# Patient Record
Sex: MALE | Race: WHITE | Employment: FULL TIME | URBAN - METROPOLITAN AREA
[De-identification: names, ages, dates, MRNs, and addresses within clinical notes are randomized per-mention and may not be internally consistent; named-entity substitution may affect disease eponyms.]

---

## 2018-12-28 ENCOUNTER — APPOINTMENT (OUTPATIENT)
Dept: GENERAL RADIOLOGY | Age: 58
End: 2018-12-28
Attending: EMERGENCY MEDICINE
Payer: COMMERCIAL

## 2018-12-28 ENCOUNTER — APPOINTMENT (OUTPATIENT)
Dept: CT IMAGING | Age: 58
End: 2018-12-28
Attending: EMERGENCY MEDICINE
Payer: COMMERCIAL

## 2018-12-28 ENCOUNTER — HOSPITAL ENCOUNTER (EMERGENCY)
Age: 58
Discharge: HOME | End: 2018-12-28
Attending: EMERGENCY MEDICINE | Admitting: EMERGENCY MEDICINE
Payer: COMMERCIAL

## 2018-12-28 VITALS
HEIGHT: 72 IN | TEMPERATURE: 98.3 F | HEART RATE: 83 BPM | DIASTOLIC BLOOD PRESSURE: 67 MMHG | WEIGHT: 315 LBS | BODY MASS INDEX: 42.66 KG/M2 | RESPIRATION RATE: 11 BRPM | OXYGEN SATURATION: 98 % | SYSTOLIC BLOOD PRESSURE: 118 MMHG

## 2018-12-28 DIAGNOSIS — R07.9 CHEST PAIN, UNSPECIFIED TYPE: Primary | ICD-10-CM

## 2018-12-28 LAB
ALBUMIN SERPL-MCNC: 3.6 G/DL (ref 3.5–5)
ALBUMIN/GLOB SERPL: 0.9 {RATIO} (ref 1.1–2.2)
ALP SERPL-CCNC: 98 U/L (ref 45–117)
ALT SERPL-CCNC: 70 U/L (ref 12–78)
ANION GAP SERPL CALC-SCNC: 7 MMOL/L (ref 5–15)
AST SERPL-CCNC: 101 U/L (ref 15–37)
ATRIAL RATE: 88 BPM
ATRIAL RATE: 89 BPM
BASOPHILS # BLD: 0 K/UL (ref 0–0.1)
BASOPHILS NFR BLD: 0 % (ref 0–1)
BILIRUB SERPL-MCNC: 1.1 MG/DL (ref 0.2–1)
BUN SERPL-MCNC: 18 MG/DL (ref 6–20)
BUN/CREAT SERPL: 22 (ref 12–20)
CALCIUM SERPL-MCNC: 8.5 MG/DL (ref 8.5–10.1)
CALCULATED P AXIS, ECG09: 48 DEGREES
CALCULATED P AXIS, ECG09: 51 DEGREES
CALCULATED R AXIS, ECG10: 38 DEGREES
CALCULATED R AXIS, ECG10: 40 DEGREES
CALCULATED T AXIS, ECG11: 25 DEGREES
CALCULATED T AXIS, ECG11: 4 DEGREES
CHLORIDE SERPL-SCNC: 105 MMOL/L (ref 97–108)
CK MB CFR SERPL CALC: 0.6 % (ref 0–2.5)
CK MB SERPL-MCNC: 2.1 NG/ML (ref 5–25)
CK SERPL-CCNC: 370 U/L (ref 39–308)
CO2 SERPL-SCNC: 27 MMOL/L (ref 21–32)
CREAT SERPL-MCNC: 0.81 MG/DL (ref 0.7–1.3)
DIAGNOSIS, 93000: NORMAL
DIAGNOSIS, 93000: NORMAL
DIFFERENTIAL METHOD BLD: NORMAL
EOSINOPHIL # BLD: 0.1 K/UL (ref 0–0.4)
EOSINOPHIL NFR BLD: 1 % (ref 0–7)
ERYTHROCYTE [DISTWIDTH] IN BLOOD BY AUTOMATED COUNT: 14.4 % (ref 11.5–14.5)
GLOBULIN SER CALC-MCNC: 3.9 G/DL (ref 2–4)
GLUCOSE SERPL-MCNC: 113 MG/DL (ref 65–100)
HCT VFR BLD AUTO: 44.4 % (ref 36.6–50.3)
HGB BLD-MCNC: 14.7 G/DL (ref 12.1–17)
IMM GRANULOCYTES # BLD: 0 K/UL (ref 0–0.04)
IMM GRANULOCYTES NFR BLD AUTO: 0 % (ref 0–0.5)
LIPASE SERPL-CCNC: 104 U/L (ref 73–393)
LYMPHOCYTES # BLD: 2.2 K/UL (ref 0.8–3.5)
LYMPHOCYTES NFR BLD: 27 % (ref 12–49)
MCH RBC QN AUTO: 29.9 PG (ref 26–34)
MCHC RBC AUTO-ENTMCNC: 33.1 G/DL (ref 30–36.5)
MCV RBC AUTO: 90.4 FL (ref 80–99)
MONOCYTES # BLD: 0.7 K/UL (ref 0–1)
MONOCYTES NFR BLD: 8 % (ref 5–13)
NEUTS SEG # BLD: 5.2 K/UL (ref 1.8–8)
NEUTS SEG NFR BLD: 63 % (ref 32–75)
NRBC # BLD: 0 K/UL (ref 0–0.01)
NRBC BLD-RTO: 0 PER 100 WBC
P-R INTERVAL, ECG05: 142 MS
P-R INTERVAL, ECG05: 144 MS
PLATELET # BLD AUTO: 231 K/UL (ref 150–400)
PMV BLD AUTO: 10.8 FL (ref 8.9–12.9)
POTASSIUM SERPL-SCNC: 4 MMOL/L (ref 3.5–5.1)
PROT SERPL-MCNC: 7.5 G/DL (ref 6.4–8.2)
Q-T INTERVAL, ECG07: 356 MS
Q-T INTERVAL, ECG07: 360 MS
QRS DURATION, ECG06: 104 MS
QRS DURATION, ECG06: 108 MS
QTC CALCULATION (BEZET), ECG08: 433 MS
QTC CALCULATION (BEZET), ECG08: 435 MS
RBC # BLD AUTO: 4.91 M/UL (ref 4.1–5.7)
SODIUM SERPL-SCNC: 139 MMOL/L (ref 136–145)
TROPONIN I SERPL-MCNC: <0.05 NG/ML
TROPONIN I SERPL-MCNC: <0.05 NG/ML
VENTRICULAR RATE, ECG03: 88 BPM
VENTRICULAR RATE, ECG03: 89 BPM
WBC # BLD AUTO: 8.2 K/UL (ref 4.1–11.1)

## 2018-12-28 PROCEDURE — 74011250636 HC RX REV CODE- 250/636

## 2018-12-28 PROCEDURE — 80053 COMPREHEN METABOLIC PANEL: CPT

## 2018-12-28 PROCEDURE — 93005 ELECTROCARDIOGRAM TRACING: CPT

## 2018-12-28 PROCEDURE — 96375 TX/PRO/DX INJ NEW DRUG ADDON: CPT

## 2018-12-28 PROCEDURE — 96374 THER/PROPH/DIAG INJ IV PUSH: CPT

## 2018-12-28 PROCEDURE — 85025 COMPLETE CBC W/AUTO DIFF WBC: CPT

## 2018-12-28 PROCEDURE — 71045 X-RAY EXAM CHEST 1 VIEW: CPT

## 2018-12-28 PROCEDURE — 96361 HYDRATE IV INFUSION ADD-ON: CPT

## 2018-12-28 PROCEDURE — 74174 CTA ABD&PLVS W/CONTRAST: CPT

## 2018-12-28 PROCEDURE — 74011000250 HC RX REV CODE- 250: Performed by: EMERGENCY MEDICINE

## 2018-12-28 PROCEDURE — 82550 ASSAY OF CK (CPK): CPT

## 2018-12-28 PROCEDURE — 84484 ASSAY OF TROPONIN QUANT: CPT

## 2018-12-28 PROCEDURE — 71275 CT ANGIOGRAPHY CHEST: CPT

## 2018-12-28 PROCEDURE — 83690 ASSAY OF LIPASE: CPT

## 2018-12-28 PROCEDURE — 82553 CREATINE MB FRACTION: CPT

## 2018-12-28 PROCEDURE — 74011636320 HC RX REV CODE- 636/320: Performed by: EMERGENCY MEDICINE

## 2018-12-28 PROCEDURE — 36415 COLL VENOUS BLD VENIPUNCTURE: CPT

## 2018-12-28 PROCEDURE — 74011250636 HC RX REV CODE- 250/636: Performed by: EMERGENCY MEDICINE

## 2018-12-28 PROCEDURE — 74011250637 HC RX REV CODE- 250/637: Performed by: EMERGENCY MEDICINE

## 2018-12-28 PROCEDURE — 99284 EMERGENCY DEPT VISIT MOD MDM: CPT

## 2018-12-28 PROCEDURE — 99285 EMERGENCY DEPT VISIT HI MDM: CPT

## 2018-12-28 RX ORDER — NITROGLYCERIN 0.4 MG/1
0.4 TABLET SUBLINGUAL
Status: DISCONTINUED | OUTPATIENT
Start: 2018-12-28 | End: 2018-12-28 | Stop reason: HOSPADM

## 2018-12-28 RX ORDER — FENTANYL CITRATE 50 UG/ML
50 INJECTION, SOLUTION INTRAMUSCULAR; INTRAVENOUS
Status: DISCONTINUED | OUTPATIENT
Start: 2018-12-28 | End: 2018-12-28 | Stop reason: HOSPADM

## 2018-12-28 RX ORDER — SODIUM CHLORIDE 0.9 % (FLUSH) 0.9 %
10 SYRINGE (ML) INJECTION
Status: COMPLETED | OUTPATIENT
Start: 2018-12-28 | End: 2018-12-28

## 2018-12-28 RX ORDER — ONDANSETRON 2 MG/ML
4 INJECTION INTRAMUSCULAR; INTRAVENOUS
Status: COMPLETED | OUTPATIENT
Start: 2018-12-28 | End: 2018-12-28

## 2018-12-28 RX ORDER — ONDANSETRON 2 MG/ML
INJECTION INTRAMUSCULAR; INTRAVENOUS
Status: COMPLETED
Start: 2018-12-28 | End: 2018-12-28

## 2018-12-28 RX ORDER — SODIUM CHLORIDE 9 MG/ML
50 INJECTION, SOLUTION INTRAVENOUS
Status: COMPLETED | OUTPATIENT
Start: 2018-12-28 | End: 2018-12-28

## 2018-12-28 RX ADMIN — FAMOTIDINE 20 MG: 10 INJECTION, SOLUTION INTRAVENOUS at 04:35

## 2018-12-28 RX ADMIN — LIDOCAINE HYDROCHLORIDE 40 ML: 20 SOLUTION ORAL; TOPICAL at 04:56

## 2018-12-28 RX ADMIN — SODIUM CHLORIDE 1000 ML: 900 INJECTION, SOLUTION INTRAVENOUS at 05:01

## 2018-12-28 RX ADMIN — SODIUM CHLORIDE 1000 ML: 900 INJECTION, SOLUTION INTRAVENOUS at 04:24

## 2018-12-28 RX ADMIN — SODIUM CHLORIDE 50 ML/HR: 900 INJECTION, SOLUTION INTRAVENOUS at 04:58

## 2018-12-28 RX ADMIN — ONDANSETRON 4 MG: 2 INJECTION INTRAMUSCULAR; INTRAVENOUS at 04:24

## 2018-12-28 RX ADMIN — IOPAMIDOL 100 ML: 755 INJECTION, SOLUTION INTRAVENOUS at 04:58

## 2018-12-28 RX ADMIN — Medication 10 ML: at 04:58

## 2018-12-28 RX ADMIN — NITROGLYCERIN 0.4 MG: 0.4 TABLET SUBLINGUAL at 04:15

## 2018-12-28 NOTE — ED PROVIDER NOTES
EMERGENCY DEPARTMENT HISTORY AND PHYSICAL EXAM 
 
 
Date: 12/28/2018 Patient Name: Patrizia Medina History of Presenting Illness Chief Complaint Patient presents with  Chest Pain  
  ambulatory to triage. c/o constant nonradiating left sided chest pain since 1hr pta. took 3 asa pta. reports htn h/o. History Provided By: Patient HPI: Patrizia Medina, 66 y.o. male with PMHx significant for HTN, presents ambulatory to the ED with cc of  Chest pain that started about an 1 hour pta. It woke him up from sleep. Described as sharp, radiates across chest and into the epigastric area. He denies any radiation to jaw or down arm. Associated with nausea, but no vomiting. No shortness of breath. No exacerbating or relieving factors. No other associated symptoms. He has had similar previous symptoms but never this severe. Denies fever, chills, cough, leg pain or leg swelling. There are no other complaints, changes, or physical findings at this time. PCP: Ana Vazquez MD 
 
No current facility-administered medications on file prior to encounter. Current Outpatient Medications on File Prior to Encounter Medication Sig Dispense Refill  latanoprost (XALATAN) 0.005 % ophthalmic solution  montelukast (SINGULAIR) 10 mg tablet  omeprazole (PRILOSEC) 20 mg capsule Take 20 mg by mouth daily.  cetirizine (ZYRTEC) 10 mg tablet Take  by mouth daily.  Azelastine (ASTEPRO) 0.15 % (205.5 mcg) nasal spray two (2) times a day.  chlorpheniramine-HYDROcodone (TUSSIONEX PENNKINETIC ER) 8-10 mg/5 mL suspension Take 5 mL by mouth every twelve (12) hours as needed for Cough. 60 mL 0  
 amoxicillin-clavulanate (AUGMENTIN) 875-125 mg per tablet Take 1 Tab by mouth two (2) times a day. 20 Tab 0  Hydrochlorothiazide 12.5 mg tablet Take 12.5 mg by mouth daily.  simvastatin (ZOCOR) 20 mg tablet Take  by mouth nightly.  hydrocodone-acetaminophen (LORTAB 7.5) 7.5-500 mg per tablet Take 1 Tab by mouth every six (6) hours as needed for Pain. 10 Tab 0 Past History Past Medical History: 
Past Medical History:  
Diagnosis Date  Hypertension  Other ill-defined conditions(799.89)   
 high triglycerides Past Surgical History: No past surgical history on file. Family History: No family history on file. Social History: 
Social History Tobacco Use  Smoking status: Never Smoker Substance Use Topics  Alcohol use: No  
 Drug use: Not on file Allergies: Allergies Allergen Reactions  Codeine Nausea and Vomiting Review of Systems Review of Systems Constitutional: Negative for chills and fever. HENT: Negative for congestion and sore throat. Eyes: Negative for visual disturbance. Respiratory: Negative for cough and shortness of breath. Cardiovascular: Positive for chest pain. Negative for leg swelling. Gastrointestinal: Positive for abdominal pain and nausea. Negative for blood in stool and diarrhea. Endocrine: Negative for polyuria. Genitourinary: Negative for dysuria and testicular pain. Musculoskeletal: Negative for arthralgias, joint swelling and myalgias. Skin: Negative for rash. Allergic/Immunologic: Negative for immunocompromised state. Neurological: Negative for weakness and headaches. Hematological: Does not bruise/bleed easily. Psychiatric/Behavioral: Negative for confusion. Physical Exam  
Physical Exam  
Constitutional: He is oriented to person, place, and time. He appears well-developed and well-nourished. He appears distressed (mild distress). Morbidly obese male. HENT:  
Head: Normocephalic and atraumatic. Moist mucous membranes Eyes: Conjunctivae are normal. Pupils are equal, round, and reactive to light. Right eye exhibits no discharge. Left eye exhibits no discharge. Neck: Normal range of motion. Neck supple. No tracheal deviation present. Cardiovascular: Normal rate, regular rhythm and normal heart sounds. No murmur heard. Pulmonary/Chest: Effort normal and breath sounds normal. No respiratory distress. He has no wheezes. He has no rales. Abdominal: Soft. Bowel sounds are normal. There is no tenderness. There is no rebound and no guarding. Musculoskeletal: Normal range of motion. He exhibits no edema, tenderness or deformity. Neurological: He is alert and oriented to person, place, and time. Skin: Skin is warm. No rash noted. No erythema. Diaphoretic. Psychiatric: His behavior is normal.  
Nursing note and vitals reviewed. Diagnostic Study Results Labs - Recent Results (from the past 12 hour(s)) CBC WITH AUTOMATED DIFF Collection Time: 12/28/18  3:42 AM  
Result Value Ref Range WBC 8.2 4.1 - 11.1 K/uL  
 RBC 4.91 4.10 - 5.70 M/uL  
 HGB 14.7 12.1 - 17.0 g/dL HCT 44.4 36.6 - 50.3 % MCV 90.4 80.0 - 99.0 FL  
 MCH 29.9 26.0 - 34.0 PG  
 MCHC 33.1 30.0 - 36.5 g/dL  
 RDW 14.4 11.5 - 14.5 % PLATELET 722 194 - 861 K/uL MPV 10.8 8.9 - 12.9 FL  
 NRBC 0.0 0  WBC ABSOLUTE NRBC 0.00 0.00 - 0.01 K/uL NEUTROPHILS 63 32 - 75 % LYMPHOCYTES 27 12 - 49 % MONOCYTES 8 5 - 13 % EOSINOPHILS 1 0 - 7 % BASOPHILS 0 0 - 1 % IMMATURE GRANULOCYTES 0 0.0 - 0.5 % ABS. NEUTROPHILS 5.2 1.8 - 8.0 K/UL  
 ABS. LYMPHOCYTES 2.2 0.8 - 3.5 K/UL  
 ABS. MONOCYTES 0.7 0.0 - 1.0 K/UL  
 ABS. EOSINOPHILS 0.1 0.0 - 0.4 K/UL  
 ABS. BASOPHILS 0.0 0.0 - 0.1 K/UL  
 ABS. IMM. GRANS. 0.0 0.00 - 0.04 K/UL  
 DF AUTOMATED METABOLIC PANEL, COMPREHENSIVE Collection Time: 12/28/18  3:42 AM  
Result Value Ref Range Sodium 139 136 - 145 mmol/L Potassium 4.0 3.5 - 5.1 mmol/L Chloride 105 97 - 108 mmol/L  
 CO2 27 21 - 32 mmol/L Anion gap 7 5 - 15 mmol/L Glucose 113 (H) 65 - 100 mg/dL  BUN 18 6 - 20 MG/DL  
 Creatinine 0.81 0.70 - 1.30 MG/DL  
 BUN/Creatinine ratio 22 (H) 12 - 20 GFR est AA >60 >60 ml/min/1.73m2 GFR est non-AA >60 >60 ml/min/1.73m2 Calcium 8.5 8.5 - 10.1 MG/DL Bilirubin, total 1.1 (H) 0.2 - 1.0 MG/DL  
 ALT (SGPT) 70 12 - 78 U/L  
 AST (SGOT) 101 (H) 15 - 37 U/L Alk. phosphatase 98 45 - 117 U/L Protein, total 7.5 6.4 - 8.2 g/dL Albumin 3.6 3.5 - 5.0 g/dL Globulin 3.9 2.0 - 4.0 g/dL A-G Ratio 0.9 (L) 1.1 - 2.2    
TROPONIN I Collection Time: 12/28/18  3:42 AM  
Result Value Ref Range Troponin-I, Qt. <0.05 <0.05 ng/mL CK W/ REFLX CKMB Collection Time: 12/28/18  3:42 AM  
Result Value Ref Range  (H) 39 - 308 U/L  
CK-MB,QUANT. Collection Time: 12/28/18  3:42 AM  
Result Value Ref Range CK - MB 2.1 <3.6 NG/ML  
 CK-MB Index 0.6 0 - 2.5 LIPASE Collection Time: 12/28/18  3:42 AM  
Result Value Ref Range Lipase 104 73 - 393 U/L  
EKG, 12 LEAD, SUBSEQUENT Collection Time: 12/28/18  4:09 AM  
Result Value Ref Range Ventricular Rate 89 BPM  
 Atrial Rate 89 BPM  
 P-R Interval 144 ms QRS Duration 104 ms Q-T Interval 356 ms  
 QTC Calculation (Bezet) 433 ms Calculated P Axis 48 degrees Calculated R Axis 40 degrees Calculated T Axis 4 degrees Diagnosis Normal sinus rhythm Normal ECG No previous ECGs available TROPONIN I Collection Time: 12/28/18  6:20 AM  
Result Value Ref Range Troponin-I, Qt. <0.05 <0.05 ng/mL Radiologic Studies -  
CTA CHEST W OR W WO CONT Final Result IMPRESSION:   
No evidence of aortic aneurysm or dissection. No proximal pulmonary embolism. Hepatic steatosis. Hydropic gallbladder. Right adrenal adenoma. CTA ABDOMEN PELV W CONT Final Result IMPRESSION:   
No evidence of aortic aneurysm or dissection. No proximal pulmonary embolism. Hepatic steatosis. Hydropic gallbladder. Right adrenal adenoma. XR CHEST PORT Final Result IMPRESSION:  
No acute process. CT Results  (Last 48 hours) 12/28/18 0458  CTA [facility] CONT Final result Impression:  IMPRESSION:   
No evidence of aortic aneurysm or dissection. No proximal pulmonary embolism. Hepatic steatosis. Hydropic gallbladder. Right adrenal adenoma. Narrative: CLINICAL HISTORY: Chest pain, epigastric pain INDICATION:  Chest pain, epigastric pain COMPARISON:  None TECHNIQUE:   
Following the uneventful intravenous administration of 100 cc Isovue-370, thin  
axial images were obtained through the chest, abdomen and pelvis. Coronal and  
sagittal reconstructions were generated. Oral contrast was not administered. 3-D MIP reconstructed imaging obtained. CT dose reduction was achieved through use of a standardized protocol tailored  
for this examination and automatic exposure control for dose modulation. Adaptive statistical iterative reconstruction (ASIR) was utilized. FINDINGS:   
   
SUPRACLAVICULAR REGION: Major cervical vasculature within normal limits. No  
supraclavicular adenopathy. VASCULATURE: The main pulmonary artery is unremarkable. Thoracic aorta is normal  
in caliber. There is no evidence of dissection. Abdominal aorta normal in  
caliber. Common iliac and external iliac vessels normal in caliber. No aortic aneurysm or dissection. No proximal pulmonary embolism is identified. MEDIASTINUM: No mass or lymphadenopathy. No hilar or mediastinal  
lymphadenopathy. No esophageal mass. No endotracheal or endobronchial mass. PLEURA/LUNGS[de-identified] No effusion or pneumothorax. No nodule, mass, or airspace  
disease. There is no pleural or pericardial effusion. SOFT TISSUE/ AXILLA: No axillary adenopathy. No chest wall mass. LIVER: Hepatic steatosis There is no intrahepatic duct dilatation. The CBD is not dilated. There is no hepatic parenchymal mass. Hepatic enhancement pattern  
is within normal limits. The portal vein is patent. Hydropic gallbladder. SPLEEN/PANCREAS: No mass. . There is no pancreatic mass. There is no pancreatic  
duct dilatation. There is no evidence of splenomegaly. ADRENALS/KIDNEYS: Right adrenal adenoma. .. There is no adrenal mass. There is no  
hydroureter or hydronephrosis. There is no renal mass. There is no perinephric  
mass. No renal ureteral or bladder calculus. COLON AND SMALL BOWEL: No dilatation or wall thickening. There is no obstruction  
or free intraperitoneal air. There is no evidence of incarceration or  
obstruction. No mesenteric adenopathy. No retroperitoneal adenopathy. APPENDIX: Unremarkable. URINARY BLADDER: No mass or calculus. PELVIS: There is no pelvic adenopathy. There is no pelvic sidewall mass. There  
is no inguinal adenopathy. BONES: Retroperitoneal lymph node is prominent short axis dimension is 12 mm,  
nonspecific by [id number] 29 x 24 mm in size adjacent to the pancreas. Spondylolysis  
and spondylolisthesis at L5-S1 with severe bilateral foraminal stenosis at L5-S1. No lytic or blastic lesions. No evidence of fracture or dislocation. 12/28/18 0458  CTA ABDOMEN PELV W CONT Final result Impression:  IMPRESSION:   
No evidence of aortic aneurysm or dissection. No proximal pulmonary embolism. Hepatic steatosis. Hydropic gallbladder. Right adrenal adenoma. Narrative: CLINICAL HISTORY: Chest pain, epigastric pain INDICATION:  Chest pain, epigastric pain COMPARISON:  None TECHNIQUE:   
Following the uneventful intravenous administration of 100 cc Isovue-370, thin  
axial images were obtained through the chest, abdomen and pelvis. Coronal and  
sagittal reconstructions were generated. Oral contrast was not administered. 3-D MIP reconstructed imaging obtained. CT dose reduction was achieved through use of a standardized protocol tailored  
for this examination and automatic exposure control for dose modulation. Adaptive statistical iterative reconstruction (ASIR) was utilized. FINDINGS:   
   
SUPRACLAVICULAR REGION: Major cervical vasculature within normal limits. No  
supraclavicular adenopathy. VASCULATURE: The main pulmonary artery is unremarkable. Thoracic aorta is normal  
in caliber. There is no evidence of dissection. Abdominal aorta normal in  
caliber. Common iliac and external iliac vessels normal in caliber. No aortic aneurysm or dissection. No proximal pulmonary embolism is identified. MEDIASTINUM: No mass or lymphadenopathy. No hilar or mediastinal  
lymphadenopathy. No esophageal mass. No endotracheal or endobronchial mass. PLEURA/LUNGS[de-identified] No effusion or pneumothorax. No nodule, mass, or airspace  
disease. There is no pleural or pericardial effusion. SOFT TISSUE/ AXILLA: No axillary adenopathy. No chest wall mass. LIVER: Hepatic steatosis There is no intrahepatic duct dilatation. The CBD is  
not dilated. There is no hepatic parenchymal mass. Hepatic enhancement pattern  
is within normal limits. The portal vein is patent. Hydropic gallbladder. SPLEEN/PANCREAS: No mass. . There is no pancreatic mass. There is no pancreatic  
duct dilatation. There is no evidence of splenomegaly. ADRENALS/KIDNEYS: Right adrenal adenoma. .. There is no adrenal mass. There is no  
hydroureter or hydronephrosis. There is no renal mass. There is no perinephric  
mass. No renal ureteral or bladder calculus. COLON AND SMALL BOWEL: No dilatation or wall thickening. There is no obstruction  
or free intraperitoneal air. There is no evidence of incarceration or  
obstruction. No mesenteric adenopathy. No retroperitoneal adenopathy. APPENDIX: Unremarkable. URINARY BLADDER: No mass or calculus. PELVIS: There is no pelvic adenopathy. There is no pelvic sidewall mass. There  
is no inguinal adenopathy. BONES: Retroperitoneal lymph node is prominent short axis dimension is 12 mm,  
nonspecific by [id number] 29 x 24 mm in size adjacent to the pancreas. Spondylolysis  
and spondylolisthesis at L5-S1 with severe bilateral foraminal stenosis at L5-S1. No lytic or blastic lesions. No evidence of fracture or dislocation. CXR Results  (Last 48 hours) 12/28/18 0422  XR CHEST PORT Final result Impression:  IMPRESSION:  
No acute process. Narrative:  PORTABLE CHEST RADIOGRAPH/S: 12/28/2018 4:22 AM  
   
Clinical history: Chest pain INDICATION:   chest pain COMPARISON: None FINDINGS:  
AP portable upright view of the chest demonstrates normal cardiopericardial  
silhouette. The lungs are adequately expanded. There is no edema, effusion,  
consolidation, or pneumothorax. The osseous structures are unremarkable. Patient  
is on a cardiac monitor. Medical Decision Making I am the first provider for this patient. I reviewed the vital signs, available nursing notes, past medical history, past surgical history, family history and social history. Vital Signs-Reviewed the patient's vital signs. Patient Vitals for the past 12 hrs: 
 Temp Pulse Resp BP SpO2  
12/28/18 0530  83 11 118/67 98 % 12/28/18 0458  84 18 129/70 95 % 12/28/18 0438   (!) 92 107/61   
12/28/18 0428  81 18 (!) 89/64 99 % 12/28/18 0254 98.3 °F (36.8 °C) 85 20 157/83 98 % Pulse Oximetry Analysis - 100% on RA Cardiac Monitor:  
Rate: 81 bpm 
Rhythm: Normal Sinus Rhythm EKG interpretation: (Preliminary) Sinus rhythm, rate 88. Normal axis, normal intervals, no acute ischemic changes. Records Reviewed: Nursing Notes, Old Medical Records, Previous electrocardiograms, Previous Radiology Studies and Previous Laboratory Studies Provider Notes (Medical Decision Making):  
Patient presents with CP. DDx:  ACS, Aortic dissection, PNA, PE, PTX, pericarditis, myocarditis, GERD, costochondritis, anxiety. Will obtain labs, CXR, EKG. Patient has multiple risk factors for ACS, heart score 4. ED Course:  
Initial assessment performed. The patients presenting problems have been discussed, and they are in agreement with the care plan formulated and outlined with them. I have encouraged them to ask questions as they arise throughout their visit. 4:28 AM 
Patient becoming more nauseous and hypotensive. EKG non ischemic. Will get IV fluids and get CT scan 
 
 
6:02 AM 
Imaging reviewed. CT imaging reassuring. Troponin negative. Symptoms improved, reports symptoms have almost essentially resolved. CONSULT NOTE:  
6:19 AM 
Jewels Multani with Dr. Liliana Peacock MD, Specialty: Cardiology Discussed pt's hx, disposition, and available diagnostic and imaging results. Reviewed care plans. Consultant agrees with plans as outlined. If two troponins are negative, Dr. Liliana Peacock feels like the patient can be discharged from a cardiac perspective, although another cause of his discomfort may require further workup. Aaron Story SIGN OUT: 
7:08 AM 
Patient's presentation, labs/imaging and plan of care was reviewed with  as part of sign out. 's assistance in completion of this plan is greatly appreciated but it should be noted that I will be the provider of record for this patient. Gordon Fowler DO 
 
Critical Care Time: 0 Disposition: 
Discharge Note: 
6:43 AM 
The pt is ready for discharge. The pt's signs, symptoms, diagnosis, and discharge instructions have been discussed and pt has conveyed their understanding. The pt is to follow up as recommended or return to ER should their symptoms worsen. Plan has been discussed and pt is in agreement. PLAN: 
1. Current Discharge Medication List  
  
 
2. Follow-up Information Follow up With Specialties Details Why Contact Info Fer Ray MD Cardiology Schedule an appointment as soon as possible for a visit  [address]. [phone] [facility] EMERGENCY DEPT Emergency Medicine  If symptoms worsen [address] [facility] 
[phone] Elba Mayes MD Family Practice Schedule an appointment as soon as possible for a visit  [address] 
[address] [address] [phone] Return to ED if worse Diagnosis Clinical Impression: 1. Chest pain, unspecified type This note will not be viewable in [address]

## 2020-04-20 ENCOUNTER — VIRTUAL VISIT (OUTPATIENT)
Dept: SLEEP MEDICINE | Age: 60
End: 2020-04-20

## 2020-04-20 VITALS — HEIGHT: 72 IN | WEIGHT: 315 LBS | BODY MASS INDEX: 42.66 KG/M2

## 2020-04-20 DIAGNOSIS — I10 ESSENTIAL HYPERTENSION: ICD-10-CM

## 2020-04-20 DIAGNOSIS — G47.33 OSA (OBSTRUCTIVE SLEEP APNEA): Primary | ICD-10-CM

## 2020-04-20 RX ORDER — TAMSULOSIN HYDROCHLORIDE 0.4 MG/1
0.4 CAPSULE ORAL DAILY
COMMUNITY

## 2020-04-20 NOTE — PATIENT INSTRUCTIONS
[address] [facility] [place]. [place], [address]  Tel.  [phone]  Fax. 100 [facility] 60  [place], [address] [facility]  Tel.  [phone]  Fax. [phone] [address]  Tel.  [phone]  Fax. [phone]     Learning About CPAP for Sleep Apnea  What is CPAP? CPAP is a small machine that you use at home every night while you sleep. It increases air pressure in your throat to keep your airway open. When you have sleep apnea, this can help you sleep better so you feel much better. CPAP stands for \"continuous positive airway pressure. \"  The CPAP machine will have one of the following:  · A mask that covers your nose and mouth  · Prongs that fit into your nose  · A mask that covers your nose only, the most common type. This type is called NCPAP. The N stands for \"nasal.\"  Why is it done? CPAP is usually the best treatment for obstructive sleep apnea. It is the first treatment choice and the most widely used. Your doctor may suggest CPAP if you have:  · Moderate to severe sleep apnea. · Sleep apnea and coronary artery disease (CAD) or heart failure. How does it help? · CPAP can help you have more normal sleep, so you feel less sleepy and more alert during the daytime. · CPAP may help keep heart failure or other heart problems from getting worse. · NCPAP may help lower your blood pressure. · If you use CPAP, your bed partner may also sleep better because you are not snoring or restless. What are the side effects? Some people who use CPAP have:  · A dry or stuffy nose and a sore throat. · Irritated skin on the face. · Sore eyes. · Bloating. If you have any of these problems, work with your doctor to fix them. Here are some things you can try:  · Be sure the mask or nasal prongs fit well. · See if your doctor can adjust the pressure of your CPAP. · If your nose is dry, try a humidifier.   · If your nose is runny or stuffy, try decongestant medicine or a steroid nasal spray. If these things do not help, you might try a different type of machine. Some machines have air pressure that adjusts on its own. Others have air pressures that are different when you breathe in than when you breathe out. This may reduce discomfort caused by too much pressure in your nose. Where can you learn more? Go to MyDoc.be  Enter Gerson Zabala in the search box to learn more about \"Learning About CPAP for Sleep Apnea. \"   © 6233-0228 Healthwise, Incorporated. Care instructions adapted under license by [employer] (which disclaims liability or warranty for this information). This care instruction is for use with your licensed healthcare professional. If you have questions about a medical condition or this instruction, always ask your healthcare professional. [address] any warranty or liability for your use of this information. Content Version: 5.3.34818; Last Revised: January 11, 2010  PROPER SLEEP HYGIENE    What to avoid  · Do not have drinks with caffeine, such as coffee or black tea, for 8 hours before bed. · Do not smoke or use other types of tobacco near bedtime. Nicotine is a stimulant and can keep you awake. · Avoid drinking alcohol late in the evening, because it can cause you to wake in the middle of the night. · Do not eat a big meal close to bedtime. If you are hungry, eat a light snack. · Do not drink a lot of water close to bedtime, because the need to urinate may wake you up during the night. · Do not read or watch TV in bed. Use the bed only for sleeping and sexual activity. What to try  · Go to bed at the same time every night, and wake up at the same time every morning. Do not take naps during the day. · Keep your bedroom quiet, dark, and cool. · Get regular exercise, but not within 3 to 4 hours of your bedtime. .  · Sleep on a comfortable pillow and mattress.   · If watching the clock makes you anxious, turn it facing away from you so you cannot see the time. · If you worry when you lie down, start a worry book. Well before bedtime, write down your worries, and then set the book and your concerns aside. · Try meditation or other relaxation techniques before you go to bed. · If you cannot fall asleep, get up and go to another room until you feel sleepy. Do something relaxing. Repeat your bedtime routine before you go to bed again. · Make your house quiet and calm about an hour before bedtime. Turn down the lights, turn off the TV, log off the computer, and turn down the volume on music. This can help you relax after a busy day. Drowsy Driving: The [facility] 54 cites drowsiness as a causing factor in more than 004,718 police reported crashes annually, resulting in 76,000 injuries and 1,500 deaths. Other surveys suggest 55% of people polled have driven while drowsy in the past year, 23% had fallen asleep but not crashed, 3% crashed, and 2% had and accident due to drowsy driving. Who is at risk? Young Drivers: One study of drowsy driving accidents states that 55% of the drivers were under 25 years. Of those, 75% were male. Shift Workers and Travelers: People who work overnight or travel across time zones frequently are at higher risk of experiencing Circadian Rhythm Disorders. They are trying to work and function when their body is programed to sleep. Sleep Deprived: Lack of sleep has a serious impact on your ability to pay attention or focus on a task. Consistently getting less than the average of 8 hours your body needs creates partial or cumulative sleep deprivation. Untreated Sleep Disorders: Sleep Apnea, Narcolepsy, R.L.S., and other sleep disorders (untreated) prevent a person from getting enough restful sleep. This leads to excessive daytime sleepiness and increases the risk for drowsy driving accidents by up to 7 times.   Medications / Alcohol: Even over the counter medications can cause drowsiness. Medications that impair a drivers attention should have a warning label. Alcohol naturally makes you sleepy and on its own can cause accidents. Combined with excessive drowsiness its effects are amplified. Signs of Drowsy Driving:   * You don't remember driving the last few miles   * You may drift out of your juan david   * You are unable to focus and your thoughts wander   * You may yawn more often than normal   * You have difficulty keeping your eyes open / nodding off   * Missing traffic signs, speeding, or tailgating  Prevention-   Good sleep hygiene, lifestyle and behavioral choices have the most impact on drowsy driving. There is no substitute for sleep and the average person requires 8 hours nightly. If you find yourself driving drowsy, stop and sleep. Consider the sleep hygiene tips provided during your visit as well. Medication Refill Policy: Refills for all medications require 1 week advance notice. Please have your pharmacy fax a refill request. We are unable to fax, or call in \"controled substance\" medications and you will need to pick these prescriptions up from our office. [employer] Activation    Thank you for requesting access to [employer]. Please follow the instructions below to securely access and download your online medical record. [employer] allows you to send messages to your doctor, view your test results, renew your prescriptions, schedule appointments, and more. How Do I Sign Up? 1. In your internet browser, go to https://Managed by [email or web address]. 2. Click on the First Time User? Click Here link in the Sign In box. You will see the New Member Sign Up page. 3. Enter your [employer] Access Code exactly as it appears below. You will not need to use this code after youve completed the sign-up process. If you do not sign up before the expiration date, you must request a new code.     [employer] Access Code: [email or web address]  Expires: 2020  8:38 AM (This is the date your Intelligize access code will )    4. Enter the last four digits of your Social Security Number (xxxx) and Date of Birth (mm/dd/yyyy) as indicated and click Submit. You will be taken to the next sign-up page. 5. Create a Intelligize ID. This will be your Intelligize login ID and cannot be changed, so think of one that is secure and easy to remember. 6. Create a Intelligize password. You can change your password at any time. 7. Enter your Password Reset Question and Answer. This can be used at a later time if you forget your password. 8. Enter your e-mail address. You will receive e-mail notification when new information is available in [address] 9. Click Sign Up. You can now view and download portions of your medical record. 10. Click the Download Summary menu link to download a portable copy of your medical information. Additional Information    If you have questions, please call [phone]. Remember, [employer]elligize is NOT to be used for urgent needs. For medical emergencies, dial 911.

## 2020-04-20 NOTE — PROGRESS NOTES
[address] [facility] [place]. [place], [address]   Tel.  [phone]   Fax. 100 [facility] 60   [place], [address] [facility]   Tel.  [phone]   Fax. [phone] [address] [place]   Tel.  [phone]   Fax. [phone]       Subjective:   Stacia Israel is a 64 y.o. male who was seen by synchronous (real-time) audio-video technology on 4/20/2020. Consent:  He and/or his healthcare decision maker is aware that this patient-initiated Telehealth encounter is a billable service, with coverage as determined by his insurance carrier. He is aware that he may receive a bill and has provided verbal consent to proceed: Yes    I was at home while this encounter. Patient verified with 's License. Stacia Israel is seen for a positive airway pressure follow-up, last seen by Dr. Aamir Slater on 9/4/2015, prior notes reviewed in detail. Home sleep test 2/2015 showed AHI of 87.8/hr with a lowest SaO2 of 59%. He  is seen today for follow up on device set up 3/12/2015 as APAP 5-12. He is interested in a new device. He reports no problems using the device. The following concerns reviewed:    Drowsiness no Problems exhaling no   Snoring no Forget to put on no   Mask Comfortable yes Can't fall asleep no   Dry Mouth no Mask falls off no   Air Leaking no Frequent awakenings no       He admits that his sleep has improved on PAP therapy using full face mask and non-heated tubing. Review of device download not available. Patient reports he uses the device nightly. Prospect Sleepiness Score: 0 and Modified F.O.S.Q. Score Total / 2: 20 which reflects improved sleep quality over therapy time.     Allergies   Allergen Reactions    Biaxin [Clarithromycin] Nausea and Vomiting    Codeine Nausea and Vomiting       He has a current medication list which includes the following prescription(s): tamsulosin, latanoprost, montelukast, omeprazole, cetirizine, azelastine, hydrochlorothiazide, simvastatin, chlorpheniramine-hydrocodone, amoxicillin-clavulanate, and hydrocodone-acetaminophen. .      He  has a past medical history of Hypertension and Other ill-defined conditions(799.89). Review of Systems:  Constitutional:  No significant weight loss or weight gain. Eyes:  No blurred vision. CVS:  No significant chest pain  Pulm:  No significant shortness of breath  GI:  No significant nausea or vomiting  :  No significant nocturia  Musculoskeletal:  + significant joint pain at night, shoulders  Skin:  No significant rashes  Neuro:  No significant dizziness   Psych:  No active mood issues    Sleep Review of Systems: notable for Negative difficulty falling asleep; Positive awakenings at night; Negative early morning headaches; Negative memory problems; Negative concentration issues; Negative chest pain; Negative shortness of breath;  Negative rashes or itching; Negative heartburn; Negative significant mood issues; 0 afternoon naps per week    Objective:   Vitals reported by patient to Medical Assistant    Visit Vitals  Ht 6' (1.829 m)   Wt (!) 417 lb (189.1 kg)   BMI 56.56 kg/m²          Physical Exam completed by visual and auditory observation of patient with verbal input from patient. General:   Alert, oriented, not in acute distress   Eyes:  Anicteric Sclerae; no obvious strabismus   Nose:  No obvious nasal septum deviation    Neck:   Midline trachea, no visible mass   Chest/Lungs:  Respiratory effort normal, no visualized signs of difficulty breathing or respiratory distress   CVS:  No JVD   Extremities:  No obvious rashes noted on face, neck, or hands   Neuro:  No facial asymmetry, no focal deficits; no obvious tremor    Psych:  Normal affect,  normal countenance       Assessment:       ICD-10-CM ICD-9-CM    1. GENIE (obstructive sleep apnea) G47.33 327.23 AMB SUPPLY ORDER   2. Essential hypertension I10 401.9    3.  Adult BMI 50.0-59.9 kg/sq m ([address] Z68.43 V85.43        AHI = 87.8(2/2015). On APAP :  ? cmH2O. He is adherent with PAP therapy and PAP continues to benefit patient and remains necessary for control of his sleep apnea. Plan:     Follow-up and Dispositions    · Return in about 2 months (around 6/20/2020) for first adherence on new device. * His current device has reached it's life expectancy and supplies for his [address] One are no longer available. He would like to proceed with device replacement. Patient will be seen in follow-up in 6-8 weeks after PAP setup to gauge treatment response and adherence to therapy. Orders Placed This Encounter    AMB SUPPLY ORDER     Diagnosis: (G47.33) GENIE (obstructive sleep apnea)  (primary encounter diagnosis)       Respironics Dreamstation APAP 6 cm H20 to 15 cm H20    Heated Humidifier  Modem, earline access to sleep center  Length of need 99    Mask-fitting evaluation and mask per patient preference     Nasal Pillows Combo Mask (Replace) 2 per month.  Nasal Pillows (Replace) 2 per month.  Nasal Cushion (Replace) 2 per month.  Nasal Interface Mask 1 every 3 months.  Full Face Mask 1 every 3 months.  Full Face Mask Cushion 1 per month.  Pos Airway pressure chin strap   Headgear 1 every 6 months.  Tubing with heating element 1 every 3 months.  Filter(s) Disposable 2 per month.  Filter(s) Non-Disposable 1 every 6 months. .   [facility] for Humidifier (Replace) 1 every 6 months. JIM GarciaKENNA-BC; NPI: [id number]    Electronically signed. Date:- 04/20/20       * The patient was counselled regarding proper sleep hygiene, with emphasis on ensuring sufficient total sleep time; safe driving and the benefits of exercise and weight loss. * All of his questions were addressed.       2. Hypertension -  continue on his current regimen, he will continue to monitor his BP and follow up with his PMD for reevaluation/adjustment of medications if warranted. I have reviewed the relationship between hypertension as it relates to sleep-disordered breathing. 3. Recommended a dedicated weight loss program through appropriate diet and exercise regimen as significant weight reduction has been shown to reduce severity of obstructive sleep apnea. Patient's phone number [phone] (cell) was reviewed and confirmed for accuracy. He gives permission for messages regarding results and appointments to be left at that number. Pursuant to the emergency declaration under the [address] [place] [address], [facility] waiver authority and the [employer] Act, this Virtual Visit was conducted, with patient's consent, to reduce the patient's risk of exposure to COVID-19 and provide continuity of care for an established patient. Services were provided through a video synchronous discussion virtually to substitute for in-person clinic visit. [facility] [place], [address] [place] [address]  Electronically signed.  04/20/20

## 2022-08-02 NOTE — DISCHARGE INSTRUCTIONS
Chest Pain: Care Instructions  Your Care Instructions    There are many things that can cause chest pain. Some are not serious and will get better on their own in a few days. But some kinds of chest pain need more testing and treatment. Your doctor may have recommended a follow-up visit in the next 8 to 12 hours. If you are not getting better, you may need more tests or treatment. Even though your doctor has released you, you still need to watch for any problems. The doctor carefully checked you, but sometimes problems can develop later. If you have new symptoms or if your symptoms do not get better, get medical care right away. If you have worse or different chest pain or pressure that lasts more than 5 minutes or you passed out (lost consciousness), call 911 or seek other emergency help right away. A medical visit is only one step in your treatment. Even if you feel better, you still need to do what your doctor recommends, such as going to all suggested follow-up appointments and taking medicines exactly as directed. This will help you recover and help prevent future problems. How can you care for yourself at home? · Rest until you feel better. · Take your medicine exactly as prescribed. Call your doctor if you think you are having a problem with your medicine. · Do not drive after taking a prescription pain medicine. When should you call for help? Call 911 if:    · You passed out (lost consciousness).     · You have severe difficulty breathing.     · You have symptoms of a heart attack. These may include:  ? Chest pain or pressure, or a strange feeling in your chest.  ? Sweating. ? Shortness of breath. ? Nausea or vomiting. ? Pain, pressure, or a strange feeling in your back, neck, jaw, or upper belly or in one or both shoulders or arms. ? Lightheadedness or sudden weakness. ? A fast or irregular heartbeat.   After you call 911, the  may tell you to chew 1 adult-strength or 2 to 4 Last office visit: 6-30-22  Next office visit: 7-6-23     Disp Refills Start End    buPROPion (ZYBAN) 150 MG 12 hr tablet 60 tablet 0 6/30/2022     Sig - Route: Take 1 tablet by mouth 2 times daily. Trial dose. Patient to call in 3 weeks  to see if current dose is working - Oral                 low-dose aspirin. Wait for an ambulance. Do not try to drive yourself.    Call your doctor today if:    · You have any trouble breathing.     · Your chest pain gets worse.     · You are dizzy or lightheaded, or you feel like you may faint.     · You are not getting better as expected.     · You are having new or different chest pain. Where can you learn more? Go to http://vick-mohan.info/. Enter A120 in the search box to learn more about \"Chest Pain: Care Instructions. \"  Current as of: November 20, 2017  Content Version: 11.8  © 7717-0003 [employer]. Care instructions adapted under license by [employer] (which disclaims liability or warranty for this information). If you have questions about a medical condition or this instruction, always ask your healthcare professional. [address] any warranty or liability for your use of this information.

## 2022-09-16 ENCOUNTER — TRANSCRIBE ORDER (OUTPATIENT)
Dept: SCHEDULING | Age: 62
End: 2022-09-16

## 2022-09-16 DIAGNOSIS — L72.3 SEBACEOUS CYST: Primary | ICD-10-CM

## 2022-09-20 ENCOUNTER — TRANSCRIBE ORDER (OUTPATIENT)
Dept: SCHEDULING | Age: 62
End: 2022-09-20

## 2022-09-20 DIAGNOSIS — L72.3 SEBACEOUS CYST: Primary | ICD-10-CM

## 2022-09-22 ENCOUNTER — TRANSCRIBE ORDER (OUTPATIENT)
Dept: SCHEDULING | Age: 62
End: 2022-09-22

## 2022-09-22 DIAGNOSIS — L72.3 SEBACEOUS CYST: Primary | ICD-10-CM

## 2022-10-05 ENCOUNTER — HOSPITAL ENCOUNTER (OUTPATIENT)
Dept: ULTRASOUND IMAGING | Age: 62
Discharge: HOME | End: 2022-10-05
Attending: FAMILY MEDICINE
Payer: COMMERCIAL

## 2022-10-05 DIAGNOSIS — L72.3 SEBACEOUS CYST: ICD-10-CM

## 2022-10-05 PROCEDURE — 76882 US LMTD JT/FCL EVL NVASC XTR: CPT

## 2022-10-05 PROCEDURE — 76536 US EXAM OF HEAD AND NECK: CPT

## 2023-04-21 DIAGNOSIS — L72.3 SEBACEOUS CYST: Primary | ICD-10-CM

## 2024-10-16 ENCOUNTER — TELEPHONE (OUTPATIENT)
Age: 64
End: 2024-10-16

## 2024-10-16 NOTE — TELEPHONE ENCOUNTER
Patients states he has a resmed 10 and were wondering if we have modem access as we do not see Ashley Cuenca ordering this machine after last visit in 2020 when patient was using a Respironics.     Thank you.

## 2025-03-07 ASSESSMENT — SLEEP AND FATIGUE QUESTIONNAIRES
ARE YOU BOTHERED BY WAKING UP TOO EARLY AND NOT BEING ABLE TO GET BACK TO SLEEP: NO
HOW LIKELY ARE YOU TO NOD OFF OR FALL ASLEEP WHEN YOU ARE A PASSENGER IN A CAR FOR AN HOUR WITHOUT A BREAK: WOULD NEVER DOZE
HOW LIKELY ARE YOU TO NOD OFF OR FALL ASLEEP WHILE SITTING AND READING: WOULD NEVER DOZE
HAS YOUR MOOD BEEN AFFECTED BECAUSE YOU ARE SLEEPY OR TIRED: NO
HOW LIKELY ARE YOU TO NOD OFF OR FALL ASLEEP WHILE WATCHING TV: WOULD NEVER DOZE
ARE YOU BOTHERED BY WAKING UP TOO EARLY AND NOT BEING ABLE TO GET BACK TO SLEEP: NO
DO YOU HAVE DIFFICULTY OPERATING A MOTOR VEHICLE FOR SHORT DISTANCES (LESS THAN 100 MILES) BECAUSE YOU BECOME SLEEPY: NO
HOW LIKELY ARE YOU TO NOD OFF OR FALL ASLEEP WHILE LYING DOWN TO REST IN THE AFTERNOON WHEN CIRCUMSTANCES PERMIT: SLIGHT CHANCE OF DOZING
HOW LIKELY ARE YOU TO NOD OFF OR FALL ASLEEP IN A CAR, WHILE STOPPED FOR A FEW MINUTES IN TRAFFIC: WOULD NEVER DOZE
DO YOU GET TOO LITTLE SLEEP AT NIGHT: NO
FOSQ SCORE: 20
NUMBER OF TIMES YOU WAKE PER NIGHT: 2
DO YOU HAVE DIFFICULTY BEING AS ACTIVE AS YOU WANT TO BE IN THE EVENING BECAUSE YOU ARE SLEEPY OR TIRED: NO
DO YOU HAVE DIFFICULTY WATCHING A MOVIE OR VIDEO BECAUSE YOU BECOME SLEEPY OR TIRED: NO
DO YOU WORK SHIFTS: NO
DO YOU TAKE NAPS: NO
HOW LIKELY ARE YOU TO NOD OFF OR FALL ASLEEP WHILE SITTING QUIETLY AFTER LUNCH WITHOUT ALCOHOL: WOULD NEVER DOZE
HOW LIKELY ARE YOU TO NOD OFF OR FALL ASLEEP WHILE SITTING AND TALKING TO SOMEONE: WOULD NEVER DOZE
DO YOU HAVE PROBLEMS WITH FREQUENT AWAKENINGS AT NIGHT: NO
HOW LIKELY ARE YOU TO NOD OFF OR FALL ASLEEP WHILE WATCHING TV: WOULD NEVER DOZE
DO YOU GENERALLY HAVE DIFFICULTY REMEMBERING THINGS BECAUSE YOU ARE SLEEPY OR TIRED: NO
HOW LIKELY ARE YOU TO NOD OFF OR FALL ASLEEP WHILE SITTING AND READING: WOULD NEVER DOZE
DO YOU HAVE DIFFICULTY VISITING YOUR FAMILY OR FRIENDS IN THEIR HOME BECAUSE YOU BECOME SLEEPY OR TIRED: NO
HOW LIKELY ARE YOU TO NOD OFF OR FALL ASLEEP WHILE LYING DOWN TO REST IN THE AFTERNOON WHEN CIRCUMSTANCES PERMIT: SLIGHT CHANCE OF DOZING
SELECT ANY OF THE FOLLOWING BEHAVIORS OBSERVED WHILE YOU ARE ASLEEP: NONE OF THE ABOVE
WHAT TIME DO YOU USUALLY GO TO BED: 00:00
HOW LIKELY ARE YOU TO NOD OFF OR FALL ASLEEP WHILE SITTING AND TALKING TO SOMEONE: WOULD NEVER DOZE
HOW LIKELY ARE YOU TO NOD OFF OR FALL ASLEEP WHEN YOU ARE A PASSENGER IN A CAR FOR AN HOUR WITHOUT A BREAK: WOULD NEVER DOZE
AVERAGE NUMBER OF SLEEP HOURS: 8
AVERAGE NUMBER OF SLEEP HOURS: 8
HOW LIKELY ARE YOU TO NOD OFF OR FALL ASLEEP WHILE SITTING INACTIVE IN A PUBLIC PLACE: WOULD NEVER DOZE
ESS TOTAL SCORE: 1
HOW LIKELY ARE YOU TO NOD OFF OR FALL ASLEEP WHILE SITTING INACTIVE IN A PUBLIC PLACE: WOULD NEVER DOZE
HOW LIKELY ARE YOU TO NOD OFF OR FALL ASLEEP WHILE SITTING QUIETLY AFTER LUNCH WITHOUT ALCOHOL: WOULD NEVER DOZE
DO YOU GET TOO LITTLE SLEEP AT NIGHT: NO
DO YOU HAVE DIFFICULTY BEING AS ACTIVE AS YOU WANT TO BE IN THE MORNING BECAUSE YOU ARE SLEEPY OR TIRED: NO
DO YOU HAVE DIFFICULTY OPERATING A MOTOR VEHICLE FOR LONG DISTANCES (GREATER THAN 100 MILES) BECAUSE YOU BECOME SLEEPY: NO
HOW LIKELY ARE YOU TO NOD OFF OR FALL ASLEEP IN A CAR, WHILE STOPPED FOR A FEW MINUTES IN TRAFFIC: WOULD NEVER DOZE
HAS YOUR RELATIONSHIP WITH FAMILY, FRIENDS OR WORK COLLEAGUES BEEN AFFECTED BECAUSE YOU ARE SLEEPY OR TIRED: NO
DO YOU HAVE DIFFICULTY CONCENTRATING ON THE THINGS YOU DO BECAUSE YOU ARE SLEEPY OR TIRED: NO

## 2025-03-10 ENCOUNTER — OFFICE VISIT (OUTPATIENT)
Age: 65
End: 2025-03-10
Payer: COMMERCIAL

## 2025-03-10 ENCOUNTER — CLINICAL DOCUMENTATION (OUTPATIENT)
Age: 65
End: 2025-03-10

## 2025-03-10 VITALS
HEART RATE: 79 BPM | SYSTOLIC BLOOD PRESSURE: 112 MMHG | BODY MASS INDEX: 42.66 KG/M2 | OXYGEN SATURATION: 96 % | HEIGHT: 72 IN | DIASTOLIC BLOOD PRESSURE: 60 MMHG | WEIGHT: 315 LBS

## 2025-03-10 DIAGNOSIS — I10 PRIMARY HYPERTENSION: ICD-10-CM

## 2025-03-10 DIAGNOSIS — G47.33 OSA (OBSTRUCTIVE SLEEP APNEA): Primary | ICD-10-CM

## 2025-03-10 PROCEDURE — 3078F DIAST BP <80 MM HG: CPT | Performed by: INTERNAL MEDICINE

## 2025-03-10 PROCEDURE — 3074F SYST BP LT 130 MM HG: CPT | Performed by: INTERNAL MEDICINE

## 2025-03-10 PROCEDURE — 99204 OFFICE O/P NEW MOD 45 MIN: CPT | Performed by: INTERNAL MEDICINE

## 2025-03-10 NOTE — PATIENT INSTRUCTIONS
[address]  [place] [address]  Tel.  [phone]  Fax. [phone] [address]  [place] [address]  Tel.  [phone]  Fax. [phone] [address] [place] [address]  Tel.  [phone]  Fax. [phone]     PROPER SLEEP HYGIENE    What to avoid  Do not have drinks with caffeine, such as coffee or black tea, for 8 hours before bed.  Do not smoke or use other types of tobacco near bedtime. Nicotine is a stimulant and can keep you awake.  Avoid drinking alcohol late in the evening, because it can cause you to wake in the middle of the night.  Do not eat a big meal close to bedtime. If you are hungry, eat a light snack.  Do not drink a lot of water close to bedtime, because the need to urinate may wake you up during the night.  Do not read or watch TV in bed. Use the bed only for sleeping and sexual activity.  What to try  Go to bed at the same time every night, and wake up at the same time every morning. Do not take naps during the day.  Keep your bedroom quiet, dark, and cool.  Get regular exercise, but not within 3 to 4 hours of your bedtime..  Sleep on a comfortable pillow and mattress.  If watching the clock makes you anxious, turn it facing away from you so you cannot see the time.  If you worry when you lie down, start a worry book. Well before bedtime, write down your worries, and then set the book and your concerns aside.  Try meditation or other relaxation techniques before you go to bed.  If you cannot fall asleep, get up and go to another room until you feel sleepy. Do something relaxing. Repeat your bedtime routine before you go to bed again.  Make your house quiet and calm about an hour before bedtime. Turn down the lights, turn off the TV, log off the computer, and turn down the volume on music. This can help you relax after a busy day.    Drowsy Driving  The [place] National Highway Traffic Safety Administration cites drowsiness as a

## 2025-03-10 NOTE — PROGRESS NOTES
[address]  [place] [address]  Tel.  [phone]  Fax. [phone] [address]  [place] [address]  Tel.  [phone]  Fax. [phone] [address] [place] [address]  Tel.  [phone]  Fax. [phone]         Subjective:      Demarcus Humphries is an 64 y.o. male referred for evaluation for a sleep disorder. He complains of he needs a new PAP associated with snoring, periods of not breathing, if he does not use his PAP.  Symptoms began many years ago, controlled since that time. He usually can fall asleep in 10 minutes.  Family or house members note snoring controlled on PAP. He denies falling asleep while driving.  Demarcus Humphries does not wake up frequently at night. He is not bothered by waking up too early and left unable to get back to sleep. He actually sleeps about 8 hours at night and wakes up about 2 times during the night. He does not work shifts:  .   Demarcus indicates he does not get too little sleep at night. His bedtime is 0000. He awakens at 0800. He does not take naps.  . He has the following observed behaviors: Not applicable;  .  Other remarks:      Estimated AHI flow from download shows 2/hour.   no significant leak  Averaging 8.5/hours use on nights used  Days with usage 100%  Adherence 100%      He says his machine motor is getting louder     Home sleep test 2/2015 showed AHI of 87.8/hr with a lowest SaO2 of 59%.       3/7/2025     3:31 PM   Sleep Medicine   Sitting and reading 0   Watching TV 0   Sitting, inactive in a public place (e.g. a theatre or a meeting) 0   As a passenger in a car for an hour without a break 0   Lying down to rest in the afternoon when circumstances permit 1   Sitting and talking to someone 0   Sitting quietly after a lunch without alcohol 0   In a car, while stopped for a few minutes in traffic 0   [place] Sleepiness Score 1    Neck (Inches) 21       Patient-reported        Allergies   Allergen

## 2025-06-30 ASSESSMENT — SLEEP AND FATIGUE QUESTIONNAIRES
HAS YOUR RELATIONSHIP WITH FAMILY, FRIENDS OR WORK COLLEAGUES BEEN AFFECTED BECAUSE YOU ARE SLEEPY OR TIRED: NO
HOW LIKELY ARE YOU TO NOD OFF OR FALL ASLEEP IN A CAR, WHILE STOPPED FOR A FEW MINUTES IN TRAFFIC: WOULD NEVER DOZE
DO YOU HAVE DIFFICULTY VISITING YOUR FAMILY OR FRIENDS IN THEIR HOME BECAUSE YOU BECOME SLEEPY OR TIRED: NO
FOSQ SCORE: 20
HOW LIKELY ARE YOU TO NOD OFF OR FALL ASLEEP WHILE SITTING QUIETLY AFTER LUNCH WITHOUT ALCOHOL: WOULD NEVER DOZE
HOW LIKELY ARE YOU TO NOD OFF OR FALL ASLEEP WHILE SITTING AND READING: WOULD NEVER DOZE
HOW LIKELY ARE YOU TO NOD OFF OR FALL ASLEEP WHILE LYING DOWN TO REST IN THE AFTERNOON WHEN CIRCUMSTANCES PERMIT: SLIGHT CHANCE OF DOZING
DO YOU HAVE DIFFICULTY BEING AS ACTIVE AS YOU WANT TO BE IN THE MORNING BECAUSE YOU ARE SLEEPY OR TIRED: NO
HOW LIKELY ARE YOU TO NOD OFF OR FALL ASLEEP WHILE WATCHING TV: WOULD NEVER DOZE
HOW LIKELY ARE YOU TO NOD OFF OR FALL ASLEEP WHILE SITTING AND TALKING TO SOMEONE: WOULD NEVER DOZE
DO YOU HAVE DIFFICULTY OPERATING A MOTOR VEHICLE FOR SHORT DISTANCES (LESS THAN 100 MILES) BECAUSE YOU BECOME SLEEPY: NO
DO YOU HAVE DIFFICULTY OPERATING A MOTOR VEHICLE FOR LONG DISTANCES (GREATER THAN 100 MILES) BECAUSE YOU BECOME SLEEPY: NO
HOW LIKELY ARE YOU TO NOD OFF OR FALL ASLEEP WHILE SITTING INACTIVE IN A PUBLIC PLACE: WOULD NEVER DOZE
HOW LIKELY ARE YOU TO NOD OFF OR FALL ASLEEP WHILE WATCHING TV: WOULD NEVER DOZE
DO YOU GENERALLY HAVE DIFFICULTY REMEMBERING THINGS BECAUSE YOU ARE SLEEPY OR TIRED: NO
HOW LIKELY ARE YOU TO NOD OFF OR FALL ASLEEP WHILE SITTING AND READING: WOULD NEVER DOZE
HOW LIKELY ARE YOU TO NOD OFF OR FALL ASLEEP WHILE LYING DOWN TO REST IN THE AFTERNOON WHEN CIRCUMSTANCES PERMIT: SLIGHT CHANCE OF DOZING
HOW LIKELY ARE YOU TO NOD OFF OR FALL ASLEEP IN A CAR, WHILE STOPPED FOR A FEW MINUTES IN TRAFFIC: WOULD NEVER DOZE
DO YOU HAVE DIFFICULTY BEING AS ACTIVE AS YOU WANT TO BE IN THE EVENING BECAUSE YOU ARE SLEEPY OR TIRED: NO
DO YOU HAVE DIFFICULTY WATCHING A MOVIE OR VIDEO BECAUSE YOU BECOME SLEEPY OR TIRED: NO
HAS YOUR MOOD BEEN AFFECTED BECAUSE YOU ARE SLEEPY OR TIRED: NO
HOW LIKELY ARE YOU TO NOD OFF OR FALL ASLEEP WHEN YOU ARE A PASSENGER IN A CAR FOR AN HOUR WITHOUT A BREAK: WOULD NEVER DOZE
DO YOU HAVE DIFFICULTY CONCENTRATING ON THE THINGS YOU DO BECAUSE YOU ARE SLEEPY OR TIRED: NO
ESS TOTAL SCORE: 1
HOW LIKELY ARE YOU TO NOD OFF OR FALL ASLEEP WHILE SITTING INACTIVE IN A PUBLIC PLACE: WOULD NEVER DOZE
HOW LIKELY ARE YOU TO NOD OFF OR FALL ASLEEP WHEN YOU ARE A PASSENGER IN A CAR FOR AN HOUR WITHOUT A BREAK: WOULD NEVER DOZE
HOW LIKELY ARE YOU TO NOD OFF OR FALL ASLEEP WHILE SITTING AND TALKING TO SOMEONE: WOULD NEVER DOZE
HOW LIKELY ARE YOU TO NOD OFF OR FALL ASLEEP WHILE SITTING QUIETLY AFTER LUNCH WITHOUT ALCOHOL: WOULD NEVER DOZE

## 2025-06-30 NOTE — PROGRESS NOTES
[address]   [place] [address]   Tel.  [phone]   Fax. [phone]  [address]   [place] [address]   Tel.  [phone]   Fax. [phone] [address] [place] [address]   Tel.  [phone]   Fax. [phone]     Demarcus Humphries (: 1960) is a 64 y.o. male, established patient, seen for positive airway pressure follow-up, he was last seen by Dr. Beavers on 3/2025, prior notes reviewed in detail. Home sleep test 2015 showed AHI of 87.8/hr with a lowest SaO2 of 59%. He is seen today for follow up.     ASSESSMENT/PLAN:   Diagnosis Orders   1. TRAMAINE (obstructive sleep apnea)        2. Primary hypertension        3. BMI 50.0-59.9, adult ([facility])          AHI = 87 ().  On CPAP, Resmed :  7-13 cmH2O. Set up 2025.    He is adherent with PAP therapy and PAP continues to benefit patient and remains necessary for control of his sleep apnea.     No follow-up provider specified.    Sleep Apnea - Continue on current pressures. He has been purchasing his supplies out of pocket on his own which he prefers.     No orders of the defined types were placed in this encounter.    *  Counseling was provided regarding the importance of regular PAP use with emphasis on ensuring sufficient total sleep time, proper sleep hygiene, and safe driving.    * Re-enforced proper and regular cleaning for the device.    * He was asked to contact our office for any problems regarding PAP therapy.    2. Hypertension -  continue on his current regimen, he will continue to monitor his BP and follow up with his PMD for reevaluation/adjustment of medications if warranted.  I have reviewed the relationship between hypertension as it relates to sleep-disordered breathing.    3. Recommended a dedicated weight loss program through appropriate diet and exercise regimen as significant weight reduction has been shown to reduce severity of obstructive sleep apnea.

## 2025-07-01 ENCOUNTER — TELEMEDICINE (OUTPATIENT)
Age: 65
End: 2025-07-01
Payer: COMMERCIAL

## 2025-07-01 DIAGNOSIS — I10 PRIMARY HYPERTENSION: ICD-10-CM

## 2025-07-01 DIAGNOSIS — G47.33 OSA (OBSTRUCTIVE SLEEP APNEA): Primary | ICD-10-CM

## 2025-07-01 PROCEDURE — 99214 OFFICE O/P EST MOD 30 MIN: CPT | Performed by: NURSE PRACTITIONER

## 2025-07-01 RX ORDER — NEBIVOLOL 2.5 MG/1
TABLET ORAL
COMMUNITY

## 2025-07-01 RX ORDER — HYDROCHLOROTHIAZIDE 25 MG/1
TABLET ORAL
COMMUNITY

## 2025-07-01 NOTE — PATIENT INSTRUCTIONS
[address]  [place] [address]  Tel.  [phone]  Fax. [phone] [address]  [place] [address]  Tel.  [phone]  Fax. [phone] [address] [place] [address]  Tel.  [phone]  Fax. [phone]     Learning About CPAP for Sleep Apnea  What is CPAP?              CPAP is a small machine that you use at home every night while you sleep. It increases air pressure in your throat to keep your airway open. When you have sleep apnea, this can help you sleep better so you feel much better. CPAP stands for \"continuous positive airway pressure.\"  The CPAP machine will have one of the following:  A mask that covers your nose and mouth  Prongs that fit into your nose  A mask that covers your nose only, the most common type. This type is called NCPAP. The N stands for \"nasal.\"  Why is it done?  CPAP is usually the best treatment for obstructive sleep apnea. It is the first treatment choice and the most widely used. Your doctor may suggest CPAP if you have:  Moderate to severe sleep apnea.  Sleep apnea and coronary artery disease (CAD) or heart failure.  How does it help?  CPAP can help you have more normal sleep, so you feel less sleepy and more alert during the daytime.  CPAP may help keep heart failure or other heart problems from getting worse.  NCPAP may help lower your blood pressure.  If you use CPAP, your bed partner may also sleep better because you are not snoring or restless.  What are the side effects?  Some people who use CPAP have:  A dry or stuffy nose and a sore throat.  Irritated skin on the face.  Sore eyes.  Bloating.  If you have any of these problems, work with your doctor to fix them. Here are some things you can try:  Be sure the mask or nasal prongs fit well.  See if your doctor can adjust the pressure of your CPAP.  If your nose is dry, try a humidifier.  If your nose is runny or stuffy, try decongestant medicine or a steroid